# Patient Record
Sex: FEMALE | Race: WHITE
[De-identification: names, ages, dates, MRNs, and addresses within clinical notes are randomized per-mention and may not be internally consistent; named-entity substitution may affect disease eponyms.]

---

## 2017-01-19 ENCOUNTER — HOSPITAL ENCOUNTER (OUTPATIENT)
Dept: HOSPITAL 53 - M LAB REF | Age: 82
End: 2017-01-19
Attending: INTERNAL MEDICINE
Payer: MEDICARE

## 2017-01-19 DIAGNOSIS — C83.00: Primary | ICD-10-CM

## 2017-01-19 LAB
FERRITIN SERPL-MCNC: 89 NG/ML (ref 8–252)
INR PPP: 0.97
IRON SATN MFR SERPL: 17.6 % (ref 13.2–37.4)
TIBC SERPL-MCNC: 284 UG/DL (ref 250–450)

## 2017-01-23 ENCOUNTER — HOSPITAL ENCOUNTER (OUTPATIENT)
Dept: HOSPITAL 53 - M WUC | Age: 82
End: 2017-01-23
Attending: NURSE PRACTITIONER
Payer: MEDICARE
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
## 2017-01-23 DIAGNOSIS — S22.089A: Primary | ICD-10-CM
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
## 2017-01-23 NOTE — REP
LUMBAR SPINE, FIVE VIEWS:

 

HISTORY: Back pain.

 

There is a fracture of the T12 vertebral body with minimal height loss. There is

no subluxation. The intervertebral discs are decreased in height.      Vacuum

phenomenon is present at the L2-3 and L3-4 levels. These findings are consistent

with disc degeneration. Osteophytes are present     throughout   the lumbar

spine. There is narrowing of the L3-4 through L5-S1 facet joints. There is

scoliosis convex to the left.

 

IMPRESSION:

 

1. T12 vertebral body fracture with minimal height loss. CT of the thoracic spine

may be helpful for further evaluation.

 

2. Degenerative change are described above.

 

 

Signed by

Moe Schneider MD 01/23/2017 06:03 P

## 2017-01-23 NOTE — REP
THORACIC SPINE, THREE VIEWS:

 

HISTORY: Back pain.

 

COMPARISON: 12/13/2016.

 

There is a compression fracture of the T12 vertebral body with minimal height

loss. There is no subluxation. There is loss of height of several mid and lower

thoracic intervertebral discs. Anterior osteophytes are present in the mid and

lower thoracic spine.

 

IMPRESSION:

 

T12 vertebral body fracture with minimal height loss. This is a new finding

compared to the previous study. CT of the thoracic spine may be helpful for

further evaluation.

 

 

Signed by

Moe Schneider MD 01/23/2017 05:33 P

## 2017-02-07 ENCOUNTER — HOSPITAL ENCOUNTER (OUTPATIENT)
Dept: HOSPITAL 53 - M RAD | Age: 82
End: 2017-02-07
Attending: INTERNAL MEDICINE
Payer: MEDICARE

## 2017-02-07 DIAGNOSIS — R16.1: ICD-10-CM

## 2017-02-07 DIAGNOSIS — M16.0: ICD-10-CM

## 2017-02-07 DIAGNOSIS — J35.1: ICD-10-CM

## 2017-02-07 DIAGNOSIS — N28.1: ICD-10-CM

## 2017-02-07 DIAGNOSIS — M41.9: ICD-10-CM

## 2017-02-07 DIAGNOSIS — M51.9: ICD-10-CM

## 2017-02-07 DIAGNOSIS — M19.011: ICD-10-CM

## 2017-02-07 DIAGNOSIS — C83.00: Primary | ICD-10-CM

## 2017-02-07 DIAGNOSIS — M19.012: ICD-10-CM

## 2017-02-07 PROCEDURE — 70491 CT SOFT TISSUE NECK W/DYE: CPT

## 2017-02-07 PROCEDURE — 74178 CT ABD&PLV WO CNTR FLWD CNTR: CPT

## 2017-02-07 PROCEDURE — 71260 CT THORAX DX C+: CPT

## 2017-02-07 NOTE — REP
CT NECK WITH CONTRAST:

 

HISTORY: Lymphoma.

 

CONTRAST: Isovue-370 100 mL.

 

Calcifications are present in the tonsils. This is secondary to previous

inflammatory disease. There is asymmetry in the tonsils with the right being

slightly larger than the left. There is inferior extension of the tonsillar

enlargement into the right lateral wall of the oropharynx. There is very minimal

mass effect on the oropharynx.  The naso- and hypopharynx, larynx and subglottic

trachea are normal in appearance. The salivary glands are normal. A 6 mm

hypodensity is present in the left thyroid lobe. This most likely represents a

cyst. The right thyroid lobe is normal. Small lymph nodes less than 1 cm in size

are present in the internal jugular chains, posterior triangles and submandibular

areas. There is no adenopathy. Atherosclerotic calcification is present at the

carotid bifurcations. Degenerative change is present in the cervical spine.

Scarring is present in the lung apices. Mucosal thickening is present in the left

ethmoid, maxillary and frontal sinuses.

 

IMPRESSION:

 

There is slight enlargement of the right tonsil with extension of the tonsillar

enlargement into the right lateral wall of the oropharynx. There is very minimal

mass effect on the         oropharynx    . This is suspicious for a neoplasm.

 

 

Signed by

Moe Shcneider MD 02/07/2017 03:33 P

## 2017-02-07 NOTE — REP
CT of the chest with IV contrast:

 

Comparisons are 12/10/2015 and 07/18/2016.

 

There is a 9 ml ground-glass nodule in the right middle lobe unchanged from

12/10/2015, therefore, likely benign.

 

There is chronic biapical pleuroparenchymal scarring, unchanged.  Numerous small

bullae are scattered throughout the lung fields bilaterally, unchanged,

compatible with bullous emphysema.  There are no acute infiltrates or effusions.

No other lung nodules or masses are identified.

 

There is left axilla adenopathy measuring up to 13 mm short axis (9 mm on

07/18/2016).  There is no adenopathy in the right axilla.  There is no

mediastinal or hilar adenopathy.

 

The thoracic aorta is unremarkable except for calcified atheroma.  Cardiac size

is upper normal.  There is no pericardial effusion.

 

There is grade 1 wedge-shaped compression deformity of the T12 vertebral body.

There is T11-12 advanced degenerative disc disease.  There are no lytic, blastic

or destructive skeletal changes.

 

Impression:  Stable ground-glass nodule in the right middle lobe compatible with

a benign finding, unchanged from 02/10/2015.

 

Adenopathy in the left axilla as described.  No other adenopathy.  No acute

infiltrate or effusion.

 

Grade 1 compression deformity of the T12 vertebral body.  T11-12 degenerative

disc disease.

 

There is osteoarthritis of the shoulders bilaterally.

 

 

Signed by

Edmundo Wang MD 02/07/2017 02:30 P

## 2017-02-07 NOTE — REP
CT of the abdomen without and with IV contrast.  Bowel contrast is utilized on

both phases of the study.

 

Comparison is 08/17/2009.

 

Prior IV contrast the abdomen is scan of the pelvis is excluded.  After IV

contrast of the abdomen and pelvis are scanned.

 

The liver is homogeneous and normal size.  The gallbladder and pancreas are

unremarkable.  The spleen is enlarged measuring 12 cm craniocaudad by 9 cm

transversely by 11 cm AP.  No focal splenic lesions are identified.

 

The adrenals are unremarkable.  There are bilateral renal cortical cysts, the

largest on the left is a parapelvic cyst extending into the lower pole measuring

6.6 cm.  The largest on the right is a lower pole cortical cyst measuring 3.8

cm.

 

There is no hydronephrosis.  On the images prior IV contrast.  There are a few

sub centimeter hyperdense left renal cortical cysts.

 

There is an infrarenal abdominal aortic aneurysm measuring up to 3.8 cm

(previously 3.0 cm).  There is no periaortic hematoma.  No retroperitoneal

adenopathy.

 

There is no mesenteric adenopathy.

 

Pelvis:

 

I suspect there is a left femoral adenopathy measuring up to 1.3 cm short axis.

No other pelvic adenopathy is identified.

 

There is a hysterectomy.  The vaginal cuff and adnexa are unremarkable.  The

bladder is unremarkable.

 

There is a large volume of fecal residue throughout the entire colon.

 

No diverticulosis or diverticulitis is identified.

 

 

 

There is grade 1 wedge-shaped compression deformity of the T12 vertebral body.

There is advanced degenerative disc disease with disc vacuum phenomenon at

T11-12, L2-3 and L3-4.

 

There is advanced osteoarthritis of the hips bilaterally.

 

No lytic, blastic or destructive skeletal changes are identified.

 

Impression:

 

Splenomegaly.  Bilateral renal cysts.  Left femoral adenopathy.

 

Scoliosis, multilevel degenerative disc disease and advanced bilateral hip

osteoarthritis.

 

 

Signed by

Edmundo Wang MD 02/07/2017 02:17 P

## 2017-02-15 ENCOUNTER — HOSPITAL ENCOUNTER (OUTPATIENT)
Dept: HOSPITAL 53 - M LAB REF | Age: 82
End: 2017-02-15
Attending: INTERNAL MEDICINE
Payer: MEDICARE

## 2017-02-15 DIAGNOSIS — C83.00: Primary | ICD-10-CM

## 2017-09-07 ENCOUNTER — HOSPITAL ENCOUNTER (OUTPATIENT)
Dept: HOSPITAL 53 - M LAB REF | Age: 82
End: 2017-09-07
Attending: INTERNAL MEDICINE
Payer: MEDICARE

## 2017-09-07 DIAGNOSIS — C85.90: Primary | ICD-10-CM

## 2017-09-07 DIAGNOSIS — D64.9: ICD-10-CM

## 2017-09-07 LAB
FERRITIN SERPL-MCNC: 97 NG/ML (ref 8–252)
IRON SATN MFR SERPL: 12.4 % (ref 13.2–45)
TIBC SERPL-MCNC: 251 UG/DL (ref 250–450)

## 2017-10-18 ENCOUNTER — HOSPITAL ENCOUNTER (OUTPATIENT)
Dept: HOSPITAL 53 - M SDC | Age: 82
Discharge: HOME | End: 2017-10-18
Attending: OPHTHALMOLOGY
Payer: MEDICARE

## 2017-10-18 VITALS — SYSTOLIC BLOOD PRESSURE: 166 MMHG | DIASTOLIC BLOOD PRESSURE: 78 MMHG

## 2017-10-18 VITALS — WEIGHT: 136 LBS | HEIGHT: 67 IN | BODY MASS INDEX: 21.35 KG/M2

## 2017-10-18 DIAGNOSIS — H25.9: Primary | ICD-10-CM

## 2017-10-18 DIAGNOSIS — E78.5: ICD-10-CM

## 2017-10-18 DIAGNOSIS — J44.9: ICD-10-CM

## 2017-10-18 DIAGNOSIS — I10: ICD-10-CM

## 2017-10-18 DIAGNOSIS — I25.2: ICD-10-CM

## 2017-10-18 DIAGNOSIS — Z98.61: ICD-10-CM

## 2017-10-18 DIAGNOSIS — Z79.899: ICD-10-CM

## 2017-10-18 DIAGNOSIS — Z79.82: ICD-10-CM

## 2017-10-18 DIAGNOSIS — Z88.0: ICD-10-CM

## 2017-10-18 DIAGNOSIS — F17.210: ICD-10-CM

## 2017-10-18 PROCEDURE — 66984 XCAPSL CTRC RMVL W/O ECP: CPT

## 2017-11-15 ENCOUNTER — HOSPITAL ENCOUNTER (OUTPATIENT)
Dept: HOSPITAL 53 - M SDC | Age: 82
Discharge: HOME | End: 2017-11-15
Attending: OPHTHALMOLOGY
Payer: MEDICARE

## 2017-11-15 VITALS — HEIGHT: 66 IN | WEIGHT: 134 LBS | BODY MASS INDEX: 21.53 KG/M2

## 2017-11-15 VITALS — DIASTOLIC BLOOD PRESSURE: 68 MMHG | SYSTOLIC BLOOD PRESSURE: 144 MMHG

## 2017-11-15 DIAGNOSIS — Z91.040: ICD-10-CM

## 2017-11-15 DIAGNOSIS — I25.10: ICD-10-CM

## 2017-11-15 DIAGNOSIS — F17.210: ICD-10-CM

## 2017-11-15 DIAGNOSIS — Z79.02: ICD-10-CM

## 2017-11-15 DIAGNOSIS — I25.2: ICD-10-CM

## 2017-11-15 DIAGNOSIS — E78.5: ICD-10-CM

## 2017-11-15 DIAGNOSIS — J44.9: ICD-10-CM

## 2017-11-15 DIAGNOSIS — Z79.82: ICD-10-CM

## 2017-11-15 DIAGNOSIS — Z98.61: ICD-10-CM

## 2017-11-15 DIAGNOSIS — I10: ICD-10-CM

## 2017-11-15 DIAGNOSIS — Z79.899: ICD-10-CM

## 2017-11-15 DIAGNOSIS — Z88.0: ICD-10-CM

## 2017-11-15 DIAGNOSIS — H25.9: Primary | ICD-10-CM

## 2017-11-15 PROCEDURE — 66984 XCAPSL CTRC RMVL W/O ECP: CPT

## 2018-03-05 ENCOUNTER — HOSPITAL ENCOUNTER (OUTPATIENT)
Dept: HOSPITAL 53 - M PLARAD | Age: 83
End: 2018-03-05
Attending: ORTHOPAEDIC SURGERY
Payer: MEDICARE
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
## 2018-03-05 DIAGNOSIS — S46.912A: Primary | ICD-10-CM
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
## 2018-03-05 PROCEDURE — 73221 MRI JOINT UPR EXTREM W/O DYE: CPT

## 2019-01-07 ENCOUNTER — HOSPITAL ENCOUNTER (OUTPATIENT)
Dept: HOSPITAL 53 - M LAB REF | Age: 84
End: 2019-01-07
Attending: NURSE PRACTITIONER
Payer: MEDICARE

## 2019-01-07 DIAGNOSIS — C83.00: Primary | ICD-10-CM

## 2019-01-07 LAB
EOSINOPHIL NFR BLD MANUAL: 2 % (ref 0–5)
HYPOCHROMIA BLD QL SMEAR: (no result)
LYMPHOCYTES NFR BLD MANUAL: 22 % (ref 16–52)
MONOCYTES NFR BLD MANUAL: 15 % (ref 0–8)
NEUTROPHILS NFR BLD MANUAL: 25 % (ref 35–75)
OVALOCYTES BLD QL SMEAR: (no result)
PLATELET BLD QL SMEAR: NORMAL
SMUDGE CELLS BLD QL SMEAR: (no result)
VARIANT LYMPHS NFR BLD MANUAL: 36 % (ref 0–5)

## 2019-04-10 ENCOUNTER — HOSPITAL ENCOUNTER (OUTPATIENT)
Dept: HOSPITAL 53 - M LAB REF | Age: 84
End: 2019-04-10
Attending: INTERNAL MEDICINE
Payer: MEDICARE

## 2019-04-10 DIAGNOSIS — C83.00: Primary | ICD-10-CM

## 2019-04-10 LAB
EOSINOPHIL NFR BLD MANUAL: 1 % (ref 0–5)
LYMPHOCYTES NFR BLD MANUAL: 59 % (ref 16–52)
MONOCYTES NFR BLD MANUAL: 6 % (ref 0–8)
NEUTROPHILS NFR BLD MANUAL: 32 % (ref 35–75)
OVALOCYTES BLD QL SMEAR: (no result)
PLATELET BLD QL SMEAR: NORMAL
POIKILOCYTOSIS BLD QL SMEAR: (no result)
SMUDGE CELLS BLD QL SMEAR: (no result)
VARIANT LYMPHS NFR BLD MANUAL: 1 % (ref 0–5)

## 2019-04-11 LAB
IRON SATN MFR SERPL: 9 % (ref 13.2–45)
IRON SERPL-MCNC: 21 UG/DL (ref 50–170)
TIBC SERPL-MCNC: 234 UG/DL (ref 250–450)

## 2019-07-10 ENCOUNTER — HOSPITAL ENCOUNTER (INPATIENT)
Dept: HOSPITAL 53 - M ED | Age: 84
LOS: 5 days | Discharge: HOME | DRG: 54 | End: 2019-07-15
Attending: INTERNAL MEDICINE
Payer: MEDICARE

## 2019-07-10 VITALS — WEIGHT: 113.54 LBS | BODY MASS INDEX: 18.25 KG/M2 | HEIGHT: 66 IN

## 2019-07-10 VITALS — DIASTOLIC BLOOD PRESSURE: 70 MMHG | SYSTOLIC BLOOD PRESSURE: 165 MMHG

## 2019-07-10 DIAGNOSIS — I10: ICD-10-CM

## 2019-07-10 DIAGNOSIS — Z88.0: ICD-10-CM

## 2019-07-10 DIAGNOSIS — Z79.899: ICD-10-CM

## 2019-07-10 DIAGNOSIS — Z91.040: ICD-10-CM

## 2019-07-10 DIAGNOSIS — G93.6: ICD-10-CM

## 2019-07-10 DIAGNOSIS — Z79.82: ICD-10-CM

## 2019-07-10 DIAGNOSIS — J44.9: ICD-10-CM

## 2019-07-10 DIAGNOSIS — C79.31: Primary | ICD-10-CM

## 2019-07-10 DIAGNOSIS — E78.5: ICD-10-CM

## 2019-07-10 DIAGNOSIS — M19.90: ICD-10-CM

## 2019-07-10 DIAGNOSIS — Z66: ICD-10-CM

## 2019-07-10 DIAGNOSIS — I25.10: ICD-10-CM

## 2019-07-10 DIAGNOSIS — C85.90: ICD-10-CM

## 2019-07-10 LAB
ALBUMIN SERPL BCG-MCNC: 2.5 GM/DL (ref 3.2–5.2)
ALT SERPL W P-5'-P-CCNC: 18 U/L (ref 12–78)
ANISOCYTOSIS BLD QL SMEAR: (no result)
BASOPHILS NFR BLD MANUAL: 2 % (ref 0–4)
BILIRUB CONJ SERPL-MCNC: 0.2 MG/DL (ref 0–0.2)
BILIRUB SERPL-MCNC: 0.3 MG/DL (ref 0.2–1)
BUN SERPL-MCNC: 32 MG/DL (ref 7–18)
CALCIUM SERPL-MCNC: 8.3 MG/DL (ref 8.8–10.2)
CHLORIDE SERPL-SCNC: 108 MEQ/L (ref 98–107)
CK MB CFR.DF SERPL CALC: 3.64
CK MB SERPL-MCNC: 1.2 NG/ML (ref ?–3.6)
CK SERPL-CCNC: 33 U/L (ref 26–192)
CO2 SERPL-SCNC: 31 MEQ/L (ref 21–32)
CREAT SERPL-MCNC: 1.05 MG/DL (ref 0.55–1.3)
EOSINOPHIL NFR BLD MANUAL: 1 % (ref 0–5)
GFR SERPL CREATININE-BSD FRML MDRD: 53.4 ML/MIN/{1.73_M2} (ref 32–?)
GLUCOSE SERPL-MCNC: 91 MG/DL (ref 70–100)
HCT VFR BLD AUTO: 28.8 % (ref 36–47)
HGB BLD-MCNC: 8.6 G/DL (ref 12–15.5)
HYPOCHROMIA BLD QL SMEAR: (no result)
LYMPHOCYTES NFR BLD MANUAL: 75 % (ref 16–52)
MCH RBC QN AUTO: 25.7 PG (ref 27–33)
MCHC RBC AUTO-ENTMCNC: 29.9 G/DL (ref 32–36.5)
MCV RBC AUTO: 86.2 FL (ref 80–96)
MICROCYTES BLD QL SMEAR: (no result)
MONOCYTES NFR BLD MANUAL: 3 % (ref 0–8)
NEUTROPHILS NFR BLD MANUAL: 15 % (ref 35–75)
PLATELET # BLD AUTO: 153 10^3/UL (ref 150–450)
PLATELET BLD QL SMEAR: NORMAL
POTASSIUM SERPL-SCNC: 4.6 MEQ/L (ref 3.5–5.1)
PROT SERPL-MCNC: 6.4 GM/DL (ref 6.4–8.2)
RBC # BLD AUTO: 3.34 10^6/UL (ref 4–5.4)
SODIUM SERPL-SCNC: 143 MEQ/L (ref 136–145)
TROPONIN I SERPL-MCNC: < 0.02 NG/ML (ref ?–0.1)
TSH SERPL DL<=0.005 MIU/L-ACNC: 0.68 UIU/ML (ref 0.36–3.74)
VARIANT LYMPHS NFR BLD MANUAL: 4 % (ref 0–5)
WBC # BLD AUTO: 3.4 10^3/UL (ref 4–10)

## 2019-07-10 RX ADMIN — DOCUSATE SODIUM SCH MG: 100 CAPSULE, LIQUID FILLED ORAL at 23:40

## 2019-07-10 RX ADMIN — FLUTICASONE PROPIONATE AND SALMETEROL XINAFOATE SCH PUFF: 115; 21 AEROSOL, METERED RESPIRATORY (INHALATION) at 20:00

## 2019-07-10 RX ADMIN — SODIUM CHLORIDE SCH MLS/HR: 9 INJECTION, SOLUTION INTRAVENOUS at 16:52

## 2019-07-10 RX ADMIN — SODIUM CHLORIDE SCH MLS/HR: 9 INJECTION, SOLUTION INTRAVENOUS at 20:30

## 2019-07-10 NOTE — REPVR
EXAM:  CT Head Without Contrast



EXAM DATE/TIME:  7/10/2019 5:04 PM



CLINICAL HISTORY:  83 years old, female; Alteration of consciousness; Other: 

Altered; Additional info: Altered loc



TECHNIQUE:

  Imaging protocol:  Axial computed tomography images of the head without 

contrast.

  Radiation optimization:  All CT scans at this facility use at least one of 

these dose optimization techniques: automated exposure control; mA and/or kV 

adjustment per patient size (includes targeted exams where dose is matched to 

clinical indication); or iterative reconstruction.



COMPARISON:  No relevant prior studies available.



FINDINGS:



Geographic focus of vasogenic edema in the left frontotemporal lobe measuring 7 

x 5 cm is present, with a hyperdense 2.2 cm lesion adjacent to the anterior 

horn left lateral ventricle. This suggests a neoplasm. There may be a second 

zone of vasogenic edema in the right temporal lobe measuring 2 cm, concerning 

for a second intra-axial lesion.



Ventricles and cisterns are relatively symmetric. No midline shift, acute 

hemorrhage or evidence of acute cortical infarct.



No calvarial fracture or destructive process. 

Mucosal thickening or fluid is present in the left maxillary sinus. 





IMPRESSION:



Left frontal and probable right temporal intra-axial masses with adjacent 

vasogenic edema. MRI of the brain including sequences with IV contrast 

recommended. Metastatic disease may be more likely given the suspicion of 2 

lesions



Electronically signed by: Ron Jones On 07/10/2019  17:37:07 PM

## 2019-07-10 NOTE — REP
CHEST, TWO VIEWS:

 

Two views of the chest were performed and compared to prior study of 12/13/2016.

 

 

There may be a nodule in the right mid lung zone at the level of the hilum,

approximately 1.4 cm in diameter. Biapical pleural and parenchymal scarring is

again noted essentially unchanged. No definite acute infiltrate is seen. There is

mild left ventricular prominence. There is calcification and tortuosity of the

thoracic aorta. The mediastinal silhouette is unchanged. There are degenerative

changes of the spine.

 

IMPRESSION:

 

No definite acute infiltrate. Possible nodule right mid lung 1.4 cm in diameter.

 

 

Electronically Signed by

Edmundo Leo MD 07/11/2019 01:54 P

## 2019-07-11 VITALS — DIASTOLIC BLOOD PRESSURE: 64 MMHG | SYSTOLIC BLOOD PRESSURE: 138 MMHG

## 2019-07-11 VITALS — SYSTOLIC BLOOD PRESSURE: 145 MMHG | DIASTOLIC BLOOD PRESSURE: 75 MMHG

## 2019-07-11 VITALS — DIASTOLIC BLOOD PRESSURE: 78 MMHG | SYSTOLIC BLOOD PRESSURE: 150 MMHG

## 2019-07-11 RX ADMIN — DOCUSATE SODIUM SCH MG: 100 CAPSULE, LIQUID FILLED ORAL at 20:25

## 2019-07-11 RX ADMIN — DOCUSATE SODIUM SCH MG: 100 CAPSULE, LIQUID FILLED ORAL at 07:58

## 2019-07-11 RX ADMIN — DEXAMETHASONE SODIUM PHOSPHATE SCH MG: 4 INJECTION, SOLUTION INTRAMUSCULAR; INTRAVENOUS at 17:57

## 2019-07-11 RX ADMIN — CLOPIDOGREL BISULFATE SCH MG: 75 TABLET ORAL at 07:59

## 2019-07-11 RX ADMIN — SODIUM CHLORIDE SCH MLS/HR: 9 INJECTION, SOLUTION INTRAVENOUS at 12:30

## 2019-07-11 RX ADMIN — ATORVASTATIN CALCIUM SCH MG: 20 TABLET, FILM COATED ORAL at 07:59

## 2019-07-11 RX ADMIN — FLUTICASONE PROPIONATE AND SALMETEROL XINAFOATE SCH PUFF: 115; 21 AEROSOL, METERED RESPIRATORY (INHALATION) at 19:39

## 2019-07-11 RX ADMIN — DEXAMETHASONE SODIUM PHOSPHATE SCH MG: 4 INJECTION, SOLUTION INTRAMUSCULAR; INTRAVENOUS at 05:54

## 2019-07-11 RX ADMIN — SODIUM CHLORIDE SCH MLS/HR: 9 INJECTION, SOLUTION INTRAVENOUS at 08:30

## 2019-07-11 RX ADMIN — FERROUS GLUCONATE SCH MG: 324 TABLET ORAL at 07:58

## 2019-07-11 RX ADMIN — FLUTICASONE PROPIONATE AND SALMETEROL XINAFOATE SCH PUFF: 115; 21 AEROSOL, METERED RESPIRATORY (INHALATION) at 07:58

## 2019-07-11 RX ADMIN — ASPIRIN SCH MG: 81 TABLET ORAL at 08:51

## 2019-07-11 RX ADMIN — DEXAMETHASONE SODIUM PHOSPHATE SCH MG: 4 INJECTION, SOLUTION INTRAMUSCULAR; INTRAVENOUS at 00:57

## 2019-07-11 RX ADMIN — DEXAMETHASONE SODIUM PHOSPHATE SCH MG: 4 INJECTION, SOLUTION INTRAMUSCULAR; INTRAVENOUS at 12:18

## 2019-07-11 RX ADMIN — ATENOLOL SCH MG: 25 TABLET ORAL at 08:52

## 2019-07-11 RX ADMIN — SODIUM CHLORIDE SCH MLS/HR: 9 INJECTION, SOLUTION INTRAVENOUS at 03:15

## 2019-07-11 RX ADMIN — LEVETIRACETAM SCH MG: 250 TABLET, FILM COATED ORAL at 20:24

## 2019-07-11 RX ADMIN — SODIUM CHLORIDE SCH MLS/HR: 9 INJECTION, SOLUTION INTRAVENOUS at 08:02

## 2019-07-11 RX ADMIN — LEVETIRACETAM SCH MG: 250 TABLET, FILM COATED ORAL at 07:58

## 2019-07-11 RX ADMIN — ACETAMINOPHEN PRN MG: 650 TABLET, FILM COATED, EXTENDED RELEASE ORAL at 08:52

## 2019-07-11 RX ADMIN — FUROSEMIDE SCH MG: 20 TABLET ORAL at 07:58

## 2019-07-11 RX ADMIN — ISOSORBIDE MONONITRATE SCH MG: 60 TABLET, EXTENDED RELEASE ORAL at 08:01

## 2019-07-11 RX ADMIN — PAROXETINE HYDROCHLORIDE SCH MG: 10 TABLET, FILM COATED ORAL at 07:59

## 2019-07-11 NOTE — CR.PDOC
General


Date of Consultation:  Jul 11, 2019


Referring Provider:  LISANDRA ROYAL MD


Primary Care Physician:  Jr Watson Collins





Consultation


REASON FOR CONSULTATION/CHIEF COMPLAINT: Goals of care clarification; discharge 

planning collaboration





HISTORY OF PRESENT ILLNESS: Bernice is an 83 year old  lady known to me

from [facility] she was referred in March 2019 by Dr. Watson 

her PCP.  Bernice had complaints of chronic pain and dyspnea and we made some 

recommendations to her to improve the quality of her life, however, she dclined 

them all, preferring to continue on with her current management plan.  She was 

taken to ED after she told her daughter she didn't fell right and thought she 

needed to go to a nursing home and exhibited increasing confusion.  She was 

found to have 2 brain lesions.  She does not want any aggressive treatment.





ALLERGIES: Please see below.





HOME MEDICATIONS: Please see below.





PAST MEDICAL HISTORY:


1. COPD


2. Oasteoarthritis


3. Depression


4. Angina








FAMILY HISTORY:


noncontributory to this visit





SOCIAL HISTORY:


Marital status and/or living arrangements: , lives in senior housing, 

daughter stays with her


Tobacco use: recently quit smoking after lengthy tobacco addiction history


ETOH: NA


Illicit drug use: NA


IV drug use: NA


Other relevant social factors: 





REVIEW OF SYSTEMS:


CONSTITUTIONAL: denies fevers, chills


HEENT: denies oral pain, dysphagia, sore  throat


CARDIOVASCULAR: denies chest pain, palpitations, edema


RESPIRATORY: productive cough, denies hemoptysis.


GENITOURINARY: denies dysuria


MUSCULOSKELETAL: pain in hands, back from OA, kyphosis


GASTROINTESTINAL: denies n/v/c/d


SKIN: senile purpura


NEUROLOGICAL: denies tremors.  +confusion, difficulty remembering things and 

with word finding


PSYCHIATRIC: history of depression, deies SI/HI


ENDOCRINE: no excessive thirst


HEMATOLOGIC/LYMPHATIC: brain lesions


ALLERGIC/IMMUNOLOGIC: NA





PHYSICAL EXAMINATION:


VITAL SIGNS: Please see below.


GENERAL APPEARANCE: Resting comfortably in bed


HEENT: Oral mucosa moist, pharynx clear


RESPIRATORY: CTA, decreased breath sounds in bases


CARDIOVASCULAR: Systolic murmur, RRR.


ABDOMEN: +BS, no rebound or guarding.


EXTREMITIES: no C/C/E


NEUROLOGICAL: no tremors noted, some memory difficulties, A+O x 3


PSYCHIATRIC: Affect flat





LABORATORY DATA: Please see below.





ASSESSMENT/PLAN: 


1. Metastatic cancer in elderly lady with COPD.  Goals of care should be 

clarified.  She is hospice appropriate.  She stated she wants to go home, 

however, I do not know if this is a reasonable option for her at this point.  

Her daughter Anamaria, who has  been providing care for her has  chronic back and 

knee pain and she would need to be comfortable continuing care for her mother 

whose performance status will likely worsen with time.  I have called discharge 

planner to suggest family meeting to develop a safe discharge plan.


2. I spoke with Bernice about hopice care today.  She did not committ fully to 

this notion.  She is appropriate and would likely be best served with hospice at

 this point.  We will try to see her again tomorrow.





Vital Signs/I&O





Vital Signs








  Date Time  Temp Pulse Resp B/P (MAP) Pulse Ox O2 Delivery O2 Flow Rate FiO2


 


7/11/19 14:02   17     


 


7/11/19 14:00 97.5 51  138/64 (88) 97   


 


7/11/19 06:00       0.5 


 


7/10/19 17:15      Room Air  














I&O- Last 24 Hours up to 6 AM 


 


 7/11/19





 06:00


 


Intake Total 3200 ml


 


Output Total 500 ml


 


Balance 2700 ml











Laboratory Data


Labs 24H


Laboratory Tests 2


7/10/19 16:48: 


Urine Color YELLOW, Urine Appearance CLEAR, Urine pH 5.0, Urine Specific Gravity

1.012, Urine Protein NEGATIVE, Urine Glucose (UA) NEGATIVE, Urine Ketones 

NEGATIVE, Urine Blood NEGATIVE, Urine Nitrite NEGATIVE, Urine Bilirubin NE

GATIVE, Urine Urobilinogen 0.2, Urine Leukocyte Esterase NEGATIVE, Urine WBC 

(Auto) 0, Urine RBC (Auto) 2, Urine Hyaline Casts (Auto) 4, Urine Bacteria 

(Auto) NEGATIVE, Urine Squamous Epithelial Cells 3, Urine Mucus (Auto) SMALL, 

Urine Sperm (Auto)





Allergies


Coded Allergies:  


     Penicillins (Verified  Allergy, Mild, LOCAL REACTION AT INJ SITE, 7/10/19)


     latex (Verified  Allergy, Unknown, 7/10/19)





Home Medications


Scheduled


Aspirin (Aspir 81) 81 Mg Tab, 81 MG PO DAILY, (Reported)


Atenolol (Atenolol) 25 Mg Tab, 12.5 MG PO DAILY, (Reported)


Atorvastatin Calcium (Atorvastatin Calcium) 40 Mg Tab, 40 MG PO DAILY, 

(Reported)


Calcium Carbonate/Vitamin D3 (Calcium 600-Vit D3 400 Tablet) 1 Each Tablet, 1 

TAB PO DAILY, (Reported)


Cannabidiol (Cbd Oil)  Btl, 1 DOSE PO QID, (Reported)


Clopidogrel Bisulfate (Plavix) 75 Mg Tab, 75 MG PO DAILY, (Reported)


Cyanocobalamin (Vitamin B-12) (Vitamin B-12) 100 Mcg Tablet, 100 MCG PO DAILY, 

(Reported)


Ferrous Gluconate (Ferrous Gluconate) 324 Mg Tablet, 324 MG PO DAILY, (Reported)


Furosemide (Furosemide) 40 Mg Tab, 60 MG PO DAILY, (Reported)


Isosorbide Mononitrate (Isosorbide Mononitrate ER) 60 Mg Tab, 60 MG PO DAILY, 

(Reported)


Magnesium Chloride (Nu-Mag) 1 Tab Tab, 1 TAB PO BID, (Reported)


Paroxetine HCl (Paroxetine) 10 Mg Tablet, 10 MG PO DAILY, (Reported)


Potassium Chloride (Potassium Chloride) 20 Meq Tab, 20 MEQ PO DAILY, (Reported)


Salmeterol/Fluticasone (Advair 250-50 Diskus) 14 Puff/Inhaler Aerp, 1 PUFF INH 

BID, (Reported)





Scheduled PRN


Acetaminophen (Tylenol Arthritis) 650 Mg Tab, 650 MG PO Q8H PRN for PAIN, 

(Reported)


Ipratropium/Albuterol Sulfate (Combivent Respimat  Mcg) 1 Aer Aer, 1 PUFF 

INH QID PRN for SOB/WHEEZING, (Reported)


Nitroglycerin (Nitrostat) 0.4 Mg Subl, 0.4 MG SL Q5MP PRN for CHEST PAIN, 

(Reported)


Tramadol HCl (Tramadol HCl) 50 Mg Tab, 50 MG PO Q6H PRN for PAIN, (Reported)











Sarah ORDAZ FNP             Jul 11, 2019 16:39

## 2019-07-11 NOTE — ECGEPIP
[facility] - ED

                                       

                                       Test Date:    2019-07-10

Pat Name:     GERMAN HOOKS        Department:   

Patient ID:   [id number]                 Room:         -

Gender:       Female                   Technician:   

:          [id number]               Requested By: MADONNA SANTOS

Order Number: [id number]     Reading MD:   Delia Hickey

                                 Measurements

Intervals                              Axis          

Rate:         60                       P:            105

VA:           97                       QRS:          

QRSD:         100                      T:            

QT:           440                                    

QTc:          443                                    

                           Interpretive Statements

SINUS RHYTHM WITH SHORT VA INTERVAL

PATTERN CONSISTENT WITH PULMONARY DISEASE

LEFT ANTERIOR FASCICULAR BLOCK

POSSIBLE ANTERIOR MI, OLD

DECREASED RATE 16

Electronically Signed on 2019 7:48:07 EDT by Delia Hickey

## 2019-07-11 NOTE — IPNPDOC
Text Note


Date of Service


The patient was seen on 7/11/19.





NOTE


Ms. Norman is an 83-year-old female admitted with metastatic small cell 

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 She is found to have lesions to the

brain as well. She has historically refused all chemotherapy and currently 

refuses chemotherapy and radiation therapy. Her CODE STATUS is DNR; she and I 

have revised her MOLST form to characterize her as comfort care only. She has ex

pressed a strong wish to go home on hospice.





VS,Fishbone, I+O


VS, Fishbone, I+O





Vital Signs








  Date Time  Temp Pulse Resp B/P (MAP) Pulse Ox O2 Delivery O2 Flow Rate FiO2


 


7/11/19 14:02   17     


 


7/11/19 14:00 97.5 51  138/64 (88) 97   


 


7/11/19 06:00       0.5 


 


7/10/19 17:15      Room Air  














I&O- Last 24 Hours up to 6 AM 


 


 7/11/19





 06:00


 


Intake Total 3200 ml


 


Output Total 500 ml


 


Balance 2700 ml

















LISANDRA ROYAL MD         Jul 11, 2019 18:39

## 2019-07-11 NOTE — HPEPDOC
General


Date of Admission


Jul 10, 2019 at 19:55


Date of Service:  Jul 10, 2019


Chief Complaint


The patient is a 83-year-old female admitted with a reason for visit of Brain 

Tumor.





History of Present Illness


83f with hx of CAD, aortic aneurysm,vcopd, htn, hld and NHL. pt is brought in 

with episodes of confusion. She appears lucid currently. She reports over the 

past two days she has suddenly forgotten what she was saying or why she is 

somewhere. This happened on several different occasions for example she went to 

the pharmacy, waited on line, and when it was her turn to speak with the pharmac

ists she had no idea where she was or why she was there. 





A full ROS was performed and negative except as documented above





Home Medications


Scheduled


Aspirin (Aspir 81) 81 Mg Tab, 81 MG PO DAILY, (Reported)


Atenolol (Atenolol) 25 Mg Tab, 12.5 MG PO DAILY, (Reported)


Atorvastatin Calcium (Atorvastatin Calcium) 40 Mg Tab, 40 MG PO DAILY, 

(Reported)


Calcium Carbonate/Vitamin D3 (Calcium 600-Vit D3 400 Tablet) 1 Each Tablet, 1 

TAB PO DAILY, (Reported)


Cannabidiol (Cbd Oil)  Btl, 1 DOSE PO QID, (Reported)


Clopidogrel Bisulfate (Plavix) 75 Mg Tab, 75 MG PO DAILY, (Reported)


Cyanocobalamin (Vitamin B-12) (Vitamin B-12) 100 Mcg Tablet, 100 MCG PO DAILY, 

(Reported)


Ferrous Gluconate (Ferrous Gluconate) 324 Mg Tablet, 324 MG PO DAILY, (Reported)


Furosemide (Furosemide) 40 Mg Tab, 60 MG PO DAILY, (Reported)


Isosorbide Mononitrate (Isosorbide Mononitrate ER) 60 Mg Tab, 60 MG PO DAILY, 

(Reported)


Magnesium Chloride (Nu-Mag) 1 Tab Tab, 1 TAB PO BID, (Reported)


Paroxetine HCl (Paroxetine) 10 Mg Tablet, 10 MG PO DAILY, (Reported)


Potassium Chloride (Potassium Chloride) 20 Meq Tab, 20 MEQ PO DAILY, (Reported)


Salmeterol/Fluticasone (Advair 250-50 Diskus) 14 Puff/Inhaler Aerp, 1 PUFF INH 

BID, (Reported)





Scheduled PRN


Acetaminophen (Tylenol Arthritis) 650 Mg Tab, 650 MG PO Q8H PRN for PAIN, 

(Reported)


Ipratropium/Albuterol Sulfate (Combivent Respimat  Mcg) 1 Aer Aer, 1 PUFF 

INH QID PRN for SOB/WHEEZING, (Reported)


Nitroglycerin (Nitrostat) 0.4 Mg Subl, 0.4 MG SL Q5MP PRN for CHEST PAIN, 

(Reported)


Tramadol HCl (Tramadol HCl) 50 Mg Tab, 50 MG PO Q6H PRN for PAIN, (Reported)





Allergies


Coded Allergies:  


     Penicillins (Verified  Allergy, Mild, LOCAL REACTION AT INJ SITE, 7/10/19)


     latex (Verified  Allergy, Unknown, 7/10/19)





Past Medical History


Medical History


as above


Surgical History


as above





Family History


Significant Family History:  No pertinent family hx





Social History


* Smoker:  former Smoker


Alcohol:  Denies


Drugs:  denies





A-FIB/CHADSVASC


A-FIB History


Current/History of A-Fib/PAF?:  No


Current PO Anticoag Therapy:  No





Age/Risk Factor Scoring


CHADSVASC:  








CHADSVASC Response (Comments) Value


 


Age Risk Factor Age >/= 75 years old 2


 


Gender Risk Factor Female 1


 


Hx of CHF No 0


 


Hx of HTN Yes 1


 


Hx of Stroke/TIA/or VTE No 0


 


Hx of Diabetes No 0


 


Hx of Vascular Disease Yes 1


 


Total  5











Treatment


Treatment ordered:  NONE





Physical Examination


General Exam:  Positive: Alert, Cooperative, No Acute Distress


Eye Exam:  Positive: PERRLA


ENT Exam:  Positive: Atraumatic, Mucous membr. moist/pink, Pharynx Normal


Neck Exam:  Positive: Supple; 


   Negative: JVD, thyromegaly


Chest Exam:  Positive: Clear to auscultation, Normal air movement


Heart Exam:  Positive: Rate Normal, Regular Rhythm, Normal S1, Normal S2; 


   Negative: Murmurs, Rubs


Abdomen Exam:  Positive: Normal bowel sounds, Soft; 


   Negative: Tenderness, Hepatospenomegaly


Extremity Exam:  Positive: Normal pulses; 


   Negative: Clubbing, Cyanosis, Edema


Skin Exam:  Positive: Nl turgor and temperature; 


   Negative: Breakdown, Lesion


Neuro Exam:  Positive: Normal Gait, Normal Speech, Cranial Nerves 3-12 NL, 

Reflexes 2+


Psych Exam:  Positive: Mental status NL, Mood NL, Oriented x 3





Vital Signs





Vital Signs








  Date Time  Temp Pulse Resp B/P (MAP) Pulse Ox O2 Delivery O2 Flow Rate FiO2


 


7/10/19 23:30   17  93   


 


7/10/19 23:00 98.0 73  165/70 (101)    


 


7/10/19 17:15      Room Air  











Laboratory Data


Labs 24H


Laboratory Tests 2


7/10/19 14:45: 


White Blood Count 3.4L, Red Blood Count 3.34L, Hemoglobin 8.6L, Hematocrit 

28.8L, Mean Corpuscular Volume 86.2, Mean Corpuscular Hemoglobin 25.7L, Mean 

Corpuscular Hemoglobin Concent 29.9L, Red Cell Distribution Width 15.0H, 

Platelet Count 153, Neutrophils # (Auto) , Nucleated Red Blood Cells % (auto) 

0.0, Neutrophils 15L, Lymphocytes (Manual) 75H, Monocytes (Manual) 3, 

Eosinophils (Manual) 1, Basophils (Manual) 2, Atypical Lymphocytes 4, Platelet 

Estimate NORMAL, Hypochromasia 2+, Anisocytosis 1+, Microcytosis 1+, Anion Gap 

4L, Glomerular Filtration Rate 53.4, Calcium Level 8.3L, Aspartate Amino Transf 

(AST/SGOT) 17, Alanine Aminotransferase (ALT/SGPT) 18, Alkaline Phosphatase 97, 

Total Bilirubin 0.3, Direct Bilirubin 0.2, Total Creatine Kinase 33, Creatine 

Kinase MB 1.2, Creatine Kinase MB Relative Index 3.64, Troponin I < 0.02, Total 

Protein 6.4, Albumin 2.5L, Albumin/Globulin Ratio 0.64L, Thyroid Stimulating 

Hormone (TSH) 0.680


7/10/19 16:48: 


Urine Color YELLOW, Urine Appearance CLEAR, Urine pH 5.0, Urine Specific Gravity

1.012, Urine Protein NEGATIVE, Urine Glucose (UA) NEGATIVE, Urine Ketones 

NEGATIVE, Urine Blood NEGATIVE, Urine Nitrite NEGATIVE, Urine Bilirubin 

NEGATIVE, Urine Urobilinogen 0.2, Urine Leukocyte Esterase NEGATIVE, Urine WBC 

(Auto) 0, Urine RBC (Auto) 2, Urine Hyaline Casts (Auto) 4, Urine Bacteria 

(Auto) NEGATIVE, Urine Squamous Epithelial Cells 3, Urine Mucus (Auto) SMALL, 

Urine Sperm (Auto)


CBC/BMP


Laboratory Tests


7/10/19 14:45








Red Blood Count 3.34 L, Mean Corpuscular Volume 86.2, Mean Corpuscular 

Hemoglobin 25.7 L, Mean Corpuscular Hemoglobin Concent 29.9 L, Red Cell 

Distribution Width 15.0 H, Neutrophils # (Auto)





 Assessment/Plan


83f p/w episodes of confusion


found to have bilateral temporal lesions with surrounding vasogenic edema


seen by oncology who is recommending hospice and Rad-onc evals


family and pt confirm DNR and no interest in aggressive measures ie chemo or 

surgery


continue steroids and AEDs





copd


continue prn oxygen


continue bronchodilators





Plan / VTE


VTE Prophylaxis Ordered?:  Yes


VTE Exclusion Mechanical Proph:  N/A:VTE Prophy Ordered


VTE Exclusion Pharmacological:  Bleeding Risk





Plan


Advanced Directives:  MOLST Form is available, Do Not Resuscitate (DNR), Health 

Care Proxy (HCP)











ADITYA SEBASTIAN MD          Jul 11, 2019 00:10

## 2019-07-12 RX ADMIN — ASPIRIN SCH MG: 81 TABLET ORAL at 09:40

## 2019-07-12 RX ADMIN — ACETAMINOPHEN PRN MG: 650 TABLET, FILM COATED, EXTENDED RELEASE ORAL at 12:25

## 2019-07-12 RX ADMIN — ATENOLOL SCH MG: 25 TABLET ORAL at 10:00

## 2019-07-12 RX ADMIN — DEXAMETHASONE SODIUM PHOSPHATE SCH MG: 4 INJECTION, SOLUTION INTRAMUSCULAR; INTRAVENOUS at 06:49

## 2019-07-12 RX ADMIN — ISOSORBIDE MONONITRATE SCH MG: 60 TABLET, EXTENDED RELEASE ORAL at 09:42

## 2019-07-12 RX ADMIN — DEXAMETHASONE SODIUM PHOSPHATE SCH MG: 4 INJECTION, SOLUTION INTRAMUSCULAR; INTRAVENOUS at 01:22

## 2019-07-12 RX ADMIN — DOCUSATE SODIUM SCH MG: 100 CAPSULE, LIQUID FILLED ORAL at 09:40

## 2019-07-12 RX ADMIN — FERROUS GLUCONATE SCH MG: 324 TABLET ORAL at 09:40

## 2019-07-12 RX ADMIN — FLUTICASONE PROPIONATE AND SALMETEROL XINAFOATE SCH PUFF: 115; 21 AEROSOL, METERED RESPIRATORY (INHALATION) at 07:44

## 2019-07-12 RX ADMIN — FLUTICASONE PROPIONATE AND SALMETEROL XINAFOATE SCH PUFF: 115; 21 AEROSOL, METERED RESPIRATORY (INHALATION) at 20:00

## 2019-07-12 RX ADMIN — ATORVASTATIN CALCIUM SCH MG: 20 TABLET, FILM COATED ORAL at 09:42

## 2019-07-12 RX ADMIN — LEVETIRACETAM SCH MG: 250 TABLET, FILM COATED ORAL at 09:39

## 2019-07-12 RX ADMIN — DEXAMETHASONE SODIUM PHOSPHATE SCH MG: 4 INJECTION, SOLUTION INTRAMUSCULAR; INTRAVENOUS at 12:24

## 2019-07-12 RX ADMIN — CLOPIDOGREL BISULFATE SCH MG: 75 TABLET ORAL at 09:42

## 2019-07-12 RX ADMIN — DOCUSATE SODIUM SCH MG: 100 CAPSULE, LIQUID FILLED ORAL at 20:20

## 2019-07-12 RX ADMIN — FUROSEMIDE SCH MG: 20 TABLET ORAL at 09:40

## 2019-07-12 RX ADMIN — ACETAMINOPHEN PRN MG: 650 TABLET, FILM COATED, EXTENDED RELEASE ORAL at 20:21

## 2019-07-12 RX ADMIN — DEXAMETHASONE SODIUM PHOSPHATE SCH MG: 4 INJECTION, SOLUTION INTRAMUSCULAR; INTRAVENOUS at 17:55

## 2019-07-12 RX ADMIN — LEVETIRACETAM SCH MG: 250 TABLET, FILM COATED ORAL at 20:20

## 2019-07-12 RX ADMIN — PAROXETINE HYDROCHLORIDE SCH MG: 10 TABLET, FILM COATED ORAL at 09:42

## 2019-07-12 NOTE — CR.PDOC
General


Primary Care Physician:  Jr Watson Collins


Attending Physician:  Avinash Branham MD





Consultation


REASON FOR CONSULTATION/CHIEF COMPLAINT: follow up of plan of carefor discharge





HISTORY OF PRESENT ILLNESS:83 year old female with brain tumors, DNR/DNI/no tube

feeds.  I discussed hopice referral with her yesterday.  She indicated she 

wanted to go home.  Her daughters are present today and have discussed her plan 

for discharge with discharge planner.  





ALLERGIES: Please see below.





HOME MEDICATIONS: Please see below.





Mrs Norman is willing to  accept hospice and return to her apartment.  Her 

daughters and son have arranged to share her care so there is always someone th

ere with her.  Mrs. Norman is alert, oreinted and able to make her needs 

known.  She is in no acute distress an reports no pain.  She is going to return 

home under hospice care.





Crystal Collette LMSW will contact discharge planner regarding hospitalist 

making referral to hospice.





Vital Signs/I&O





Vital Signs








  Date Time  Temp Pulse Resp B/P (MAP) Pulse Ox O2 Delivery O2 Flow Rate FiO2


 


7/12/19 09:42    143/58    


 


7/11/19 14:02   17     


 


7/11/19 14:00 97.5 51   97   


 


7/11/19 06:00       0.5 


 


7/10/19 17:15      Room Air  














I&O- Last 24 Hours up to 6 AM 


 


 7/12/19





 05:59


 


Intake Total 3280 ml


 


Output Total 2050 ml


 


Balance 1230 ml











Allergies


Coded Allergies:  


     Penicillins (Verified  Allergy, Mild, LOCAL REACTION AT INJ SITE, 7/10/19)


     latex (Verified  Allergy, Unknown, 7/10/19)





Home Medications


Scheduled


Aspirin (Aspir 81) 81 Mg Tab, 81 MG PO DAILY, (Reported)


Atenolol (Atenolol) 25 Mg Tab, 12.5 MG PO DAILY, (Reported)


Atorvastatin Calcium (Atorvastatin Calcium) 40 Mg Tab, 40 MG PO DAILY, 

(Reported)


Calcium Carbonate/Vitamin D3 (Calcium 600-Vit D3 400 Tablet) 1 Each Tablet, 1 

TAB PO DAILY, (Reported)


Cannabidiol (Cbd Oil)  Btl, 1 DOSE PO QID, (Reported)


Clopidogrel Bisulfate (Plavix) 75 Mg Tab, 75 MG PO DAILY, (Reported)


Cyanocobalamin (Vitamin B-12) (Vitamin B-12) 100 Mcg Tablet, 100 MCG PO DAILY, 

(Reported)


Ferrous Gluconate (Ferrous Gluconate) 324 Mg Tablet, 324 MG PO DAILY, (Reported)


Furosemide (Furosemide) 40 Mg Tab, 60 MG PO DAILY, (Reported)


Isosorbide Mononitrate (Isosorbide Mononitrate ER) 60 Mg Tab, 60 MG PO DAILY, 

(Reported)


Magnesium Chloride (Nu-Mag) 1 Tab Tab, 1 TAB PO BID, (Reported)


Paroxetine HCl (Paroxetine) 10 Mg Tablet, 10 MG PO DAILY, (Reported)


Potassium Chloride (Potassium Chloride) 20 Meq Tab, 20 MEQ PO DAILY, (Reported)


Salmeterol/Fluticasone (Advair 250-50 Diskus) 14 Puff/Inhaler Aerp, 1 PUFF INH 

BID, (Reported)





Scheduled PRN


Acetaminophen (Tylenol Arthritis) 650 Mg Tab, 650 MG PO Q8H PRN for PAIN, 

(Reported)


Ipratropium/Albuterol Sulfate (Combivent Respimat  Mcg) 1 Aer Aer, 1 PUFF 

INH QID PRN for SOB/WHEEZING, (Reported)


Nitroglycerin (Nitrostat) 0.4 Mg Subl, 0.4 MG SL Q5MP PRN for CHEST PAIN, 

(Reported)


Tramadol HCl (Tramadol HCl) 50 Mg Tab, 50 MG PO Q6H PRN for PAIN, (Reported)











Sarah ORDAZ FNP             Jul 12, 2019 14:55

## 2019-07-13 VITALS — SYSTOLIC BLOOD PRESSURE: 138 MMHG | DIASTOLIC BLOOD PRESSURE: 68 MMHG

## 2019-07-13 VITALS — DIASTOLIC BLOOD PRESSURE: 72 MMHG | SYSTOLIC BLOOD PRESSURE: 166 MMHG

## 2019-07-13 RX ADMIN — DEXAMETHASONE SODIUM PHOSPHATE SCH MG: 4 INJECTION, SOLUTION INTRAMUSCULAR; INTRAVENOUS at 01:02

## 2019-07-13 RX ADMIN — ISOSORBIDE MONONITRATE SCH MG: 60 TABLET, EXTENDED RELEASE ORAL at 10:30

## 2019-07-13 RX ADMIN — FERROUS GLUCONATE SCH MG: 324 TABLET ORAL at 10:29

## 2019-07-13 RX ADMIN — FLUTICASONE PROPIONATE AND SALMETEROL XINAFOATE SCH PUFF: 115; 21 AEROSOL, METERED RESPIRATORY (INHALATION) at 08:29

## 2019-07-13 RX ADMIN — ATORVASTATIN CALCIUM SCH MG: 20 TABLET, FILM COATED ORAL at 10:29

## 2019-07-13 RX ADMIN — ACETAMINOPHEN PRN MG: 650 TABLET, FILM COATED, EXTENDED RELEASE ORAL at 05:53

## 2019-07-13 RX ADMIN — PAROXETINE HYDROCHLORIDE SCH MG: 10 TABLET, FILM COATED ORAL at 10:29

## 2019-07-13 RX ADMIN — DOCUSATE SODIUM SCH MG: 100 CAPSULE, LIQUID FILLED ORAL at 10:29

## 2019-07-13 RX ADMIN — DEXAMETHASONE SODIUM PHOSPHATE SCH MG: 4 INJECTION, SOLUTION INTRAMUSCULAR; INTRAVENOUS at 18:18

## 2019-07-13 RX ADMIN — DEXAMETHASONE SODIUM PHOSPHATE SCH MG: 4 INJECTION, SOLUTION INTRAMUSCULAR; INTRAVENOUS at 23:21

## 2019-07-13 RX ADMIN — LEVETIRACETAM SCH MG: 250 TABLET, FILM COATED ORAL at 10:28

## 2019-07-13 RX ADMIN — LEVETIRACETAM SCH MG: 250 TABLET, FILM COATED ORAL at 21:27

## 2019-07-13 RX ADMIN — CLOPIDOGREL BISULFATE SCH MG: 75 TABLET ORAL at 10:29

## 2019-07-13 RX ADMIN — ATENOLOL SCH MG: 25 TABLET ORAL at 10:29

## 2019-07-13 RX ADMIN — ASPIRIN SCH MG: 81 TABLET ORAL at 10:29

## 2019-07-13 RX ADMIN — DEXAMETHASONE SODIUM PHOSPHATE SCH MG: 4 INJECTION, SOLUTION INTRAMUSCULAR; INTRAVENOUS at 05:53

## 2019-07-13 RX ADMIN — FLUTICASONE PROPIONATE AND SALMETEROL XINAFOATE SCH PUFF: 115; 21 AEROSOL, METERED RESPIRATORY (INHALATION) at 20:00

## 2019-07-13 RX ADMIN — FUROSEMIDE SCH MG: 20 TABLET ORAL at 10:28

## 2019-07-13 RX ADMIN — DEXAMETHASONE SODIUM PHOSPHATE SCH MG: 4 INJECTION, SOLUTION INTRAMUSCULAR; INTRAVENOUS at 11:09

## 2019-07-13 RX ADMIN — DOCUSATE SODIUM SCH MG: 100 CAPSULE, LIQUID FILLED ORAL at 21:27

## 2019-07-13 NOTE — IPNPDOC
Text Note


Date of Service


The patient was seen on 7/13/19.





NOTE


Daughter is at bedside. She reports that patient has been complaining of some 

increased overall body pain. Patient is admitted with metastatic carcinoma. 





Patient is currently resting comfortably asleep.





Discussed pain management options with daughter. Patient has a low dose fentanyl

patch which can be increased. We can also transition to concentrated oral liquid

morphine.





Patient had been followed by palliative care. It may be that we will be able to 

arrange hospice services in her home on Monday.





VS,Fishbone, I+O


VS, Fishbone, I+O





Vital Signs








  Date Time  Temp Pulse Resp B/P (MAP) Pulse Ox O2 Delivery O2 Flow Rate FiO2


 


7/13/19 16:18   16     


 


7/13/19 14:00 97.5 54  138/68 (91) 99   


 


7/11/19 06:00       0.5 


 


7/10/19 17:15      Room Air  














I&O- Last 24 Hours up to 6 AM 


 


 7/13/19





 06:00


 


Intake Total 560 ml


 


Output Total 1950 ml


 


Balance -1390 ml

















LISANDRA ROYAL MD         Jul 13, 2019 20:29

## 2019-07-13 NOTE — IPNPDOC
Text Note


Date of Service


The patient was seen on 7/12/19.





NOTE


This is an 83-year-old female with metastatic carcinoma inclusive of brain 

lesions. She continues to adamantly want to go home. She has been made comfort 

care only. We're attempting to arrange hospice services. The patient wants home 

hospice, but family may be unable to accommodate this for multiple reasons and 

would like to pursue hospice placement.





Brief exam:


Cardiovascular: Regular rate and rhythm.


Respiratory: Fairly clear to auscultation, no active coughing.


Abdomen: Soft, flat, nontender


Extremities: No peripheral edema, patient has remarkable joint changes, 

especially to her left hand consistent with probable rheumatoid arthritis.





Assessment/plan:





Metastatic carcinoma. The patient has declined all treatment for quite some 

time. Most recently, she has declined radiation therapy. She is accepting of 

hospice but would prefer to go home. Family may not have adequate resources to 

take care of her at home and would prefer hospice placement.





VS,Fishbone, I+O


VS, Fishbone, I+O





Vital Signs








  Date Time  Temp Pulse Resp B/P (MAP) Pulse Ox O2 Delivery O2 Flow Rate FiO2


 


7/13/19 07:22   16     


 


7/12/19 09:42    143/58    


 


7/11/19 14:00 97.5 51   97   


 


7/11/19 06:00       0.5 


 


7/10/19 17:15      Room Air  














I&O- Last 24 Hours up to 6 AM 


 


 7/13/19





 06:00


 


Intake Total 560 ml


 


Output Total 1950 ml


 


Balance -1390 ml

















LISANDRA ROYAL MD         Jul 13, 2019 08:21

## 2019-07-13 NOTE — CR
DATE OF CONSULTATION:  07/10/2019

 

TIME:  8:30 p.m.

 

REASON FOR CONSULTATION:  Patient was found to have a brain tumor.

 

HISTORY OF PRESENT ILLNESS:

This is a 83-year-old  female with a history of coronary artery disease

(CAD), aortic aneurysm, chronic obstructive pulmonary disease (COPD),

hypertension, non-Hodgkin's lymphoma.  The patient previously had chemotherapy

for non-Hodgkin's lymphoma and she was told that she was in remission and has not

been following up with medical oncology.  She was told in November 2018 that she

had a mass around the colon.  However, the patient refused to undergo a

colonoscopy or further workup as she would refuse therapy at that point,

therefore, the patient remained on observation.  For the past week, the patient

reported altered mental status and she was brought to the ER by her daughter.

The patient reports some memory loss, however she remains alert and able to

understand counseling.

 

REVIEW OF SYSTEMS:

As per HPI, otherwise unremarkable.

 

MEDICATIONS:

Reconciled and reviewed.  Per the EMR, the patient is on aspirin, atenolol,

atorvastatin, calcium, cannabidiol, clopidogrel, B12, ferrous gluconate,

furosemide, isosorbide, magnesium, paroxetine, potassium chloride, salmeterol.

 

ALLERGIES:

1.  PENICILLIN.

2.  LATEX.

 

Past medical history, past surgical history, family history, social history as

per the HPI.

 

The patient denies smoking, use of alcohol or recreational drugs.

 

PHYSICAL EXAMINATION:

Patient is awake, alert and oriented, lying in bed.

Her vital signs were within normal range.  Temperature 97.5, heart rate 51, blood

pressure 128/64, pulse oximetry 97.

Lungs bilateral diffuse rales.

Cardiac S1 and S2, bradycardic, no added sounds.

HEENT conjunctiva slightly anicteric.  Temporal wasting was noted.

Abdomen soft, nontender.  Organomegaly unable to be assessed.

Extremities deformed due to arthritis.  No edema was noted.

Neuro nonfocal, however it is limited exam as she was lying in bed.

Lymphatics unappreciated lymph nodes in the groin, armpits.

 

LABORATORY DATA:

07/10/2019 CBC unremarkable except for a white blood cell count of 3.4,

hemoglobin 8.6, platelets 153.

 

Chemistries unremarkable except for albumin of 2.5.

 

IMAGING:

Chest x-ray revealed no definite acute infiltrate.  There was a nodule right mid

lung 1.4 cm.

 

Head CT on 07/10/2019 revealed left frontal and probable right temporal

intra-axial masses with adjacent vasogenic edema.  MRI of the brain including

sequences with IV contrast was recommended.  Metastatic disease may be more

likely given the suspicion of two lesions.

 

ASSESSMENT/PLAN:

This is an 84-year-old  female with a history of NHL status post

chemotherapy currently with a history of colon mass and presents with confusion.

Head CT revealed evidence of two metastatic lesions.

 

PLAN:

An extensive meeting with the patient and her daughter during the encounter of 55

minutes, more than 50% counseling the patient and the daughter face to face.

Both were adamant about refusing further therapy and chemotherapy.  They

requested to forego any further therapy and to pursue a palliative course.

Therefore, the patient was advised about the following and the case was discussed

with ER as well as the hospitalist, Dr. Velarde.

 

1.  Will order dexamethasone and Keppra to decrease the swelling as well as to

prevent seizures.

2.  Radiation oncology to be considered.  I counseled her on palliative

radiation.

3.  Hospice referral as per request of the family and the patient.

 

The patient was seen and examined.  Plan was submitted as above.  Both daughter

and the patient were both understanding of the above plan and agreed to proceed

as above.

## 2019-07-14 RX ADMIN — FUROSEMIDE SCH MG: 20 TABLET ORAL at 09:08

## 2019-07-14 RX ADMIN — ACETAMINOPHEN PRN MG: 650 TABLET, FILM COATED, EXTENDED RELEASE ORAL at 19:16

## 2019-07-14 RX ADMIN — FLUTICASONE PROPIONATE AND SALMETEROL XINAFOATE SCH PUFF: 115; 21 AEROSOL, METERED RESPIRATORY (INHALATION) at 07:43

## 2019-07-14 RX ADMIN — LEVETIRACETAM SCH MG: 250 TABLET, FILM COATED ORAL at 20:26

## 2019-07-14 RX ADMIN — CLOPIDOGREL BISULFATE SCH MG: 75 TABLET ORAL at 09:08

## 2019-07-14 RX ADMIN — DEXAMETHASONE SODIUM PHOSPHATE SCH MG: 4 INJECTION, SOLUTION INTRAMUSCULAR; INTRAVENOUS at 05:53

## 2019-07-14 RX ADMIN — PAROXETINE HYDROCHLORIDE SCH MG: 10 TABLET, FILM COATED ORAL at 09:08

## 2019-07-14 RX ADMIN — DOCUSATE SODIUM SCH MG: 100 CAPSULE, LIQUID FILLED ORAL at 09:07

## 2019-07-14 RX ADMIN — DEXAMETHASONE SODIUM PHOSPHATE SCH MG: 4 INJECTION, SOLUTION INTRAMUSCULAR; INTRAVENOUS at 12:07

## 2019-07-14 RX ADMIN — DEXAMETHASONE SODIUM PHOSPHATE SCH MG: 4 INJECTION, SOLUTION INTRAMUSCULAR; INTRAVENOUS at 18:17

## 2019-07-14 RX ADMIN — FERROUS GLUCONATE SCH MG: 324 TABLET ORAL at 09:08

## 2019-07-14 RX ADMIN — LEVETIRACETAM SCH MG: 250 TABLET, FILM COATED ORAL at 09:07

## 2019-07-14 RX ADMIN — ATENOLOL SCH MG: 25 TABLET ORAL at 09:08

## 2019-07-14 RX ADMIN — ISOSORBIDE MONONITRATE SCH MG: 60 TABLET, EXTENDED RELEASE ORAL at 09:07

## 2019-07-14 RX ADMIN — DOCUSATE SODIUM SCH MG: 100 CAPSULE, LIQUID FILLED ORAL at 20:25

## 2019-07-14 RX ADMIN — ASPIRIN SCH MG: 81 TABLET ORAL at 09:07

## 2019-07-14 RX ADMIN — DEXAMETHASONE SODIUM PHOSPHATE SCH MG: 4 INJECTION, SOLUTION INTRAMUSCULAR; INTRAVENOUS at 23:02

## 2019-07-14 RX ADMIN — FLUTICASONE PROPIONATE AND SALMETEROL XINAFOATE SCH PUFF: 115; 21 AEROSOL, METERED RESPIRATORY (INHALATION) at 21:12

## 2019-07-14 RX ADMIN — ATORVASTATIN CALCIUM SCH MG: 20 TABLET, FILM COATED ORAL at 09:07

## 2019-07-15 VITALS — DIASTOLIC BLOOD PRESSURE: 53 MMHG | SYSTOLIC BLOOD PRESSURE: 128 MMHG

## 2019-07-15 RX ADMIN — FUROSEMIDE SCH MG: 20 TABLET ORAL at 09:30

## 2019-07-15 RX ADMIN — FERROUS GLUCONATE SCH MG: 324 TABLET ORAL at 09:29

## 2019-07-15 RX ADMIN — ISOSORBIDE MONONITRATE SCH MG: 60 TABLET, EXTENDED RELEASE ORAL at 09:29

## 2019-07-15 RX ADMIN — DEXAMETHASONE SODIUM PHOSPHATE SCH MG: 4 INJECTION, SOLUTION INTRAMUSCULAR; INTRAVENOUS at 12:05

## 2019-07-15 RX ADMIN — DOCUSATE SODIUM SCH MG: 100 CAPSULE, LIQUID FILLED ORAL at 09:30

## 2019-07-15 RX ADMIN — PAROXETINE HYDROCHLORIDE SCH MG: 10 TABLET, FILM COATED ORAL at 09:29

## 2019-07-15 RX ADMIN — CLOPIDOGREL BISULFATE SCH MG: 75 TABLET ORAL at 09:30

## 2019-07-15 RX ADMIN — ATORVASTATIN CALCIUM SCH MG: 20 TABLET, FILM COATED ORAL at 09:26

## 2019-07-15 RX ADMIN — ASPIRIN SCH MG: 81 TABLET ORAL at 09:29

## 2019-07-15 RX ADMIN — ATENOLOL SCH MG: 25 TABLET ORAL at 09:27

## 2019-07-15 RX ADMIN — LEVETIRACETAM SCH MG: 250 TABLET, FILM COATED ORAL at 09:29

## 2019-07-15 RX ADMIN — FLUTICASONE PROPIONATE AND SALMETEROL XINAFOATE SCH PUFF: 115; 21 AEROSOL, METERED RESPIRATORY (INHALATION) at 07:48

## 2019-07-15 RX ADMIN — DEXAMETHASONE SODIUM PHOSPHATE SCH MG: 4 INJECTION, SOLUTION INTRAMUSCULAR; INTRAVENOUS at 05:56

## 2019-07-15 NOTE — DS.PDOC
Discharge Summary


General


Date of Admission


Jul 10, 2019 at 19:55


Date of Discharge


07/15/2019


Specialist/Consultants Involve:  ANNY BROOKS MD





Discharge Summary


PROCEDURES PERFORMED DURING STAY: [None].





ADMITTING DIAGNOSES: 


1. [Metastatic brain lesions].





DISCHARGE DIAGNOSES:


1. [Metastatic non-Hodgkin's lymphoma, coronary artery disease, history of 

aortic aneurysm, COPD, essential hypertension, dyslipidemia].





COMPLICATIONS/CHIEF COMPLAINT: Brain Tumor.





HISTORY OF PRESENT ILLNESS/HOSPITAL COURSE: [Ms. Norman is an unfortunate 

83-year-old female who presented with episodes of confusion. She also had 

disruption to her memory. The patient apparently has a history of unspecified 

neoplasm. Upon evaluation in the emergency room she was found to have 

significant bilateral brain metastases of significant size.


The patient was seen by the oncology service. She apparently had non-Hodgkin's l

ymphoma. She had not followed up with medical oncology despite a mass being 

found to her colon in November 2018. Radiation therapy was offered which she 

refused. The patient's greatest concern was comfort as she was having remarkable

 pain. The patient was apparently already being followed by palliative care 

services.


The patient's care was discussed with family. They're concerned about not being 

able to take care of her manage her pain adequately. We did explore the 

possibility of hospice placement. The patient adamantly wanted to go home. 

Arrangements were then made for home hospice. We managed to control the 

patient's pain in the hospital with making use of a fentanyl patch. She was very

 comfortable and maintained a good appetite.


The patient was then discharged to home when hospice services were available. 

Prescriptions were provided for comfort care management inclusive of hyoscy

amine, Ativan and concentrated morphine elixir.]. 





DISCHARGE MEDICATIONS: Please see below.


 


ALLERGIES: Please see below.





PHYSICAL EXAMINATION ON DISCHARGE:


VITAL SIGNS: Please see below.


GENERAL: [Patient is very ill and frail-appearing with a sallow complexion]


HEENT: [Neck is moderately supple, no adenopathy or thyromegaly, oral mucosa is 

moist]


CARDIOVASCULAR EXAMINATION: [Regular rate and rhythm with no murmur]


RESPIRATORY EXAMINATION: [Occasional coarse breath sounds, but no cough or wh

eezing]


ABDOMINAL EXAMINATION: [Abdomen is soft. We do elicit tenderness periodically]


EXTREMITIES: [No peripheral edema, there are joint changes and deformities 

consistent with probable rheumatoid arthritis


NEUROLOGICAL EXAMINATION: [The patient does otherwise exhibit some cognition and

 memory deficits] 


PSYCHIATRIC EXAMINATION: [The patient is able to clearly articulate what her 

wishes are]





LABORATORY DATA: Please see below.





IMAGING: 





PROGNOSIS: 





ACTIVITY: [As tolerated].





DIET: [As tolerated]





DISCHARGE PLAN: [The patient is discharged to home with home hospice services. 

Further medical management will be per the hospice medical director.]





DISPOSITION: 01 Home, Self-Care.





DISCHARGE INSTRUCTIONS:


1. .





ITEMS TO FOLLOWUP ON ON OUTPATIENT:


1. .





DISCHARGE CONDITION: [Clinically stable but with poor prognosis].





TIME SPENT ON DISCHARGE: Greater than [40] minutes.





Vital Signs/I&Os





Vital Signs








  Date Time  Temp Pulse Resp B/P (MAP) Pulse Ox O2 Delivery O2 Flow Rate FiO2


 


7/15/19 09:58   18     


 


7/15/19 09:29    128/53    


 


7/15/19 09:27  64      


 


7/13/19 14:00 97.5    99   


 


7/11/19 06:00       0.5 


 


7/10/19 17:15      Room Air  














I&O- Last 24 Hours up to 6 AM 


 


 7/15/19





 06:00


 


Intake Total 1050 ml


 


Output Total 670 ml


 


Balance 380 ml











Discharge Medications


Scheduled


Aspirin (Aspir 81) 81 Mg Tab, 81 MG PO DAILY, (Reported)


Atenolol (Atenolol) 25 Mg Tab, 12.5 MG PO DAILY, (Reported)


Atorvastatin Calcium (Atorvastatin Calcium) 40 Mg Tab, 40 MG PO DAILY, 

(Reported)


Calcium Carbonate/Vitamin D3 (Calcium 600-Vit D3 400 Tablet) 1 Each Tablet, 1 

TAB PO DAILY, (Reported)


Cannabidiol (Cbd Oil)  Btl, 1 DOSE PO QID, (Reported)


Clopidogrel Bisulfate (Plavix) 75 Mg Tab, 75 MG PO DAILY, (Reported)


Cyanocobalamin (Vitamin B-12) (Vitamin B-12) 100 Mcg Tablet, 100 MCG PO DAILY, 

(Reported)


Dexamethasone (Dexamethasone) 4 Mg Tablet, 4 MG PO BID


Fentanyl (Duragesic) 25 Mcg Patch.td72, 25 MCG TOP Q72H


Ferrous Gluconate (Ferrous Gluconate) 324 Mg Tablet, 324 MG PO DAILY, (Reported)


Furosemide (Furosemide) 40 Mg Tab, 60 MG PO DAILY, (Reported)


Isosorbide Mononitrate (Isosorbide Mononitrate ER) 60 Mg Tab, 60 MG PO DAILY, 

(Reported)


Levetiracetam (Levetiracetam) 750 Mg Tablet, 750 MG PO BID


Magnesium Chloride (Nu-Mag) 1 Tab Tab, 1 TAB PO BID, (Reported)


Paroxetine HCl (Paroxetine) 10 Mg Tablet, 10 MG PO DAILY, (Reported)


Potassium Chloride (Potassium Chloride) 20 Meq Tab, 20 MEQ PO DAILY, (Reported)


Salmeterol/Fluticasone (Advair 250-50 Diskus) 14 Puff/Inhaler Aerp, 1 PUFF INH 

BID, (Reported)





Scheduled PRN


Acetaminophen (Tylenol Arthritis) 650 Mg Tab, 650 MG PO Q8H PRN for PAIN, 

(Reported)


Hyoscyamine Sulfate (Hyoscyamine Sulfate) 0.125 Mg Tab.subl, 0.125 MG PO Q4HP 

PRN for TERMINAL SECRETIONS


   Use sublingually if unable to swallow 


Ipratropium/Albuterol Sulfate (Combivent Respimat  Mcg) 1 Aer Aer, 1 PUFF 

INH QID PRN for SOB/WHEEZING, (Reported)


Lorazepam (Lorazepam) 0.5 Mg Tablet, 0.5 MG PO Q4HP PRN for ANXIETY/AGITATION


   Use sublingually if unable to swallow 


Morphine Sulfate (Morphine Sulfate) 100 Mg/5 Ml Solution, 0.25-1 ML PO Q2H PRN 

for PAIN OR DYSPNEA


   Use sublingually if unable to swallow 


Nitroglycerin (Nitrostat) 0.4 Mg Subl, 0.4 MG SL Q5MP PRN for CHEST PAIN, 

(Reported)


Tramadol HCl (Tramadol HCl) 50 Mg Tab, 50 MG PO Q6H PRN for PAIN, (Reported)





Allergies


Coded Allergies:  


     Penicillins (Verified  Allergy, Mild, LOCAL REACTION AT INJ SITE, 7/10/19)


     latex (Verified  Allergy, Unknown, 7/10/19)











LISANDRA ROYAL MD         Jul 15, 2019 23:38